# Patient Record
Sex: FEMALE | Race: WHITE | NOT HISPANIC OR LATINO | Employment: OTHER | URBAN - METROPOLITAN AREA
[De-identification: names, ages, dates, MRNs, and addresses within clinical notes are randomized per-mention and may not be internally consistent; named-entity substitution may affect disease eponyms.]

---

## 2021-04-08 DIAGNOSIS — Z23 ENCOUNTER FOR IMMUNIZATION: ICD-10-CM

## 2024-04-08 NOTE — PROGRESS NOTES
4/9/2024      Chief Complaint   Patient presents with    Nephrolithiasis     PT unable to give a Urine sample. PT Urinated 20 min PTV.         Assessment and Plan    78 y.o. female     1. History of nephrolithiasis   2. Flank pain  - Urine sent for culture today. Trace blood. Negative for infection  - US kidney and bladder (1/25/24) in [place] showing 16 mm right ureteral stone, translated by family member  - Continued flank pain  - STAT CT ordered to confirm stone. Once confirmed, recommend fast track surgical intervention for ureteral stone. Discussed risks and benefits of surgical intervention. Discussed stent and stent colic. Discussed possible staged intervention   - Strict ER precautions given  - Call with any questions or concerns in the meantime  - All questions answered; patient understands and agrees with plan       History of Present Illness  Jayda Santos is a 78 y.o. female new patient.     Denies prior  surgical manipulation. Right side flank pain since December. US in January in [place]. US reviewed with patient and family member at appointment. Translated by family member. This is showing a 16 mm right ureteral stone with hydronephrosis. Patient has not passed stone and has not had intervention. Denies fever, chills, nausea, vomiting, gross hematuria.    Review of Systems   Constitutional:  Negative for activity change, appetite change, chills and fever.   HENT:  Negative for congestion and trouble swallowing.    Respiratory:  Negative for cough and shortness of breath.    Cardiovascular:  Negative for chest pain, palpitations and leg swelling.   Gastrointestinal:  Negative for abdominal pain, constipation, diarrhea, nausea and vomiting.   Genitourinary:  Positive for flank pain. Negative for difficulty urinating, dysuria, frequency, hematuria and urgency.   Musculoskeletal:  Negative for back pain and gait problem.   Skin:  Negative for wound.   Allergic/Immunologic: Negative for  "immunocompromised state.   Neurological:  Negative for dizziness and syncope.   Hematological:  Does not bruise/bleed easily.   Psychiatric/Behavioral:  Negative for confusion.    All other systems reviewed and are negative.      Vitals  Vitals:    04/09/24 0802   BP: 142/78   Pulse: 88   Temp: (!) 97.1 °F (36.2 °C)   TempSrc: Temporal   SpO2: 98%   Weight: 73.5 kg (162 lb)   Height: 5' 7\" (1.702 m)       Physical Exam  Constitutional:       General: She is not in acute distress.     Appearance: Normal appearance. She is not ill-appearing, toxic-appearing or diaphoretic.   HENT:      Head: Normocephalic.      Nose: No congestion.   Eyes:      General: No scleral icterus.        Right eye: No discharge.         Left eye: No discharge.      Conjunctiva/sclera: Conjunctivae normal.      Pupils: Pupils are equal, round, and reactive to light.   Pulmonary:      Effort: Pulmonary effort is normal.   Musculoskeletal:      Cervical back: Normal range of motion.   Skin:     General: Skin is warm and dry.      Coloration: Skin is not jaundiced or pale.      Findings: No bruising, erythema, lesion or rash.   Neurological:      General: No focal deficit present.      Mental Status: She is alert and oriented to person, place, and time. Mental status is at baseline.      Gait: Gait normal.   Psychiatric:         Mood and Affect: Mood normal.         Behavior: Behavior normal.         Thought Content: Thought content normal.         Judgment: Judgment normal.           Past History  Past Medical History:   Diagnosis Date    High blood pressure     2019     Social History     Socioeconomic History    Marital status: Unknown     Spouse name: None    Number of children: None    Years of education: None    Highest education level: None   Occupational History    None   Tobacco Use    Smoking status: Every Day     Types: Cigarettes     Passive exposure: Past    Smokeless tobacco: Current    Tobacco comments:     3/4rd to half a pack. " "  Vaping Use    Vaping status: Never Used   Substance and Sexual Activity    Alcohol use: Yes     Comment: Once a month    Drug use: Never    Sexual activity: Not Currently   Other Topics Concern    None   Social History Narrative    None     Social Determinants of Health     Financial Resource Strain: Not on file   Food Insecurity: Not on file   Transportation Needs: Not on file   Physical Activity: Not on file   Stress: Not on file   Social Connections: Not on file   Intimate Partner Violence: Not on file   Housing Stability: Not on file     Social History     Tobacco Use   Smoking Status Every Day    Types: Cigarettes    Passive exposure: Past   Smokeless Tobacco Current   Tobacco Comments    3/4rd to half a pack.     Family History   Problem Relation Age of Onset    Atrial fibrillation Mother     Heart attack Mother     No Known Problems Daughter     Diabetes Brother        The following portions of the patient's history were reviewed and updated as appropriate: allergies, current medications, past medical history, past social history, past surgical history and problem list.    Results  Recent Results (from the past 1 hour(s))   POCT urine dip    Collection Time: 04/09/24  8:35 AM   Result Value Ref Range    LEUKOCYTE ESTERASE,UA -     NITRITE,UA -     SL AMB POCT UROBILINOGEN 0.2     POCT URINE PROTEIN -      PH,UA 5.0     BLOOD,UA trace     SPECIFIC GRAVITY,UA 1.005     KETONES,UA -     BILIRUBIN,UA -     GLUCOSE, UA -      COLOR,UA yelloe     CLARITY,UA clear    ]  No results found for: \"PSA\"  No results found for: \"GLUCOSE\", \"CALCIUM\", \"NA\", \"K\", \"CO2\", \"CL\", \"BUN\", \"CREATININE\"  No results found for: \"WBC\", \"HGB\", \"HCT\", \"MCV\", \"PLT\"    Brooklyn Yarbrough PA-C  "

## 2024-04-09 ENCOUNTER — OFFICE VISIT (OUTPATIENT)
Dept: UROLOGY | Facility: CLINIC | Age: 79
End: 2024-04-09
Payer: COMMERCIAL

## 2024-04-09 VITALS
HEIGHT: 67 IN | SYSTOLIC BLOOD PRESSURE: 142 MMHG | TEMPERATURE: 97.1 F | OXYGEN SATURATION: 98 % | WEIGHT: 162 LBS | HEART RATE: 88 BPM | DIASTOLIC BLOOD PRESSURE: 78 MMHG | BODY MASS INDEX: 25.43 KG/M2

## 2024-04-09 DIAGNOSIS — N20.0 KIDNEY STONE: Primary | ICD-10-CM

## 2024-04-09 LAB
SL AMB  POCT GLUCOSE, UA: NORMAL
SL AMB LEUKOCYTE ESTERASE,UA: NORMAL
SL AMB POCT BILIRUBIN,UA: NORMAL
SL AMB POCT BLOOD,UA: NORMAL
SL AMB POCT CLARITY,UA: CLEAR
SL AMB POCT COLOR,UA: NORMAL
SL AMB POCT KETONES,UA: NORMAL
SL AMB POCT NITRITE,UA: NORMAL
SL AMB POCT PH,UA: 5
SL AMB POCT SPECIFIC GRAVITY,UA: 1
SL AMB POCT URINE PROTEIN: NORMAL
SL AMB POCT UROBILINOGEN: 0.2

## 2024-04-09 PROCEDURE — 99204 OFFICE O/P NEW MOD 45 MIN: CPT | Performed by: PHYSICIAN ASSISTANT

## 2024-04-09 PROCEDURE — 81002 URINALYSIS NONAUTO W/O SCOPE: CPT | Performed by: PHYSICIAN ASSISTANT

## 2024-04-09 PROCEDURE — 87086 URINE CULTURE/COLONY COUNT: CPT | Performed by: PHYSICIAN ASSISTANT

## 2024-04-09 RX ORDER — AMLODIPINE BESYLATE 10 MG/1
10 TABLET ORAL DAILY
COMMUNITY

## 2024-04-09 RX ORDER — ATORVASTATIN CALCIUM 10 MG/1
10 TABLET, FILM COATED ORAL DAILY
COMMUNITY

## 2024-04-10 ENCOUNTER — TELEPHONE (OUTPATIENT)
Dept: UROLOGY | Facility: CLINIC | Age: 79
End: 2024-04-10

## 2024-04-10 ENCOUNTER — PREP FOR PROCEDURE (OUTPATIENT)
Dept: UROLOGY | Facility: CLINIC | Age: 79
End: 2024-04-10

## 2024-04-10 ENCOUNTER — HOSPITAL ENCOUNTER (OUTPATIENT)
Dept: CT IMAGING | Facility: HOSPITAL | Age: 79
Discharge: HOME | End: 2024-04-10
Payer: COMMERCIAL

## 2024-04-10 DIAGNOSIS — N20.0 KIDNEY STONE: ICD-10-CM

## 2024-04-10 DIAGNOSIS — N20.0 NEPHROLITHIASIS: Primary | ICD-10-CM

## 2024-04-10 DIAGNOSIS — R39.89 SUSPECTED UTI: ICD-10-CM

## 2024-04-10 DIAGNOSIS — Z79.01 LONG TERM (CURRENT) USE OF ANTICOAGULANTS: ICD-10-CM

## 2024-04-10 DIAGNOSIS — Z01.812 PRE-OPERATIVE LABORATORY EXAMINATION: ICD-10-CM

## 2024-04-10 DIAGNOSIS — Z01.810 PRE-OPERATIVE CARDIOVASCULAR EXAMINATION: ICD-10-CM

## 2024-04-10 LAB — BACTERIA UR CULT: NORMAL

## 2024-04-10 PROCEDURE — 74176 CT ABD & PELVIS W/O CONTRAST: CPT

## 2024-04-10 NOTE — TELEPHONE ENCOUNTER
Spoke w/ pt's daughter, Luanne/scheduled pt for surgery w/ Dr Michaels @ [facility] for 4/18/24/pre op urine culture, labs, EKG ordered for 4/11/24/High powered laser requested on special needs for OR/surgical packet emailed to pt

## 2024-04-10 NOTE — TELEPHONE ENCOUNTER
Called and spoke to PT's daughter per CC with results and Brooklyn IBRAHIM recommendations. PT's daughter verbalized understanding.    ----- Message from Brooklyn Yarbrough PA-C sent at 4/10/2024  9:37 AM EDT -----  Please let patient know she has stone confirmed on the CT. I recommend fast track ureteroscopy. Case request placed. Strict ER precautions. Thanks

## 2024-04-11 ENCOUNTER — APPOINTMENT (OUTPATIENT)
Dept: LAB | Facility: HOSPITAL | Age: 79
End: 2024-04-11
Payer: COMMERCIAL

## 2024-04-11 ENCOUNTER — OFFICE VISIT (OUTPATIENT)
Dept: LAB | Facility: HOSPITAL | Age: 79
End: 2024-04-11
Payer: COMMERCIAL

## 2024-04-11 DIAGNOSIS — N20.0 NEPHROLITHIASIS: ICD-10-CM

## 2024-04-11 DIAGNOSIS — Z01.812 PRE-OPERATIVE LABORATORY EXAMINATION: ICD-10-CM

## 2024-04-11 DIAGNOSIS — Z01.810 PRE-OPERATIVE CARDIOVASCULAR EXAMINATION: ICD-10-CM

## 2024-04-11 DIAGNOSIS — N20.0 KIDNEY STONE: ICD-10-CM

## 2024-04-11 DIAGNOSIS — Z79.01 LONG TERM (CURRENT) USE OF ANTICOAGULANTS: ICD-10-CM

## 2024-04-11 DIAGNOSIS — R39.89 SUSPECTED UTI: ICD-10-CM

## 2024-04-11 LAB
ANION GAP SERPL CALCULATED.3IONS-SCNC: 5 MMOL/L (ref 4–13)
APTT PPP: 27 SECONDS (ref 23–37)
ATRIAL RATE: 76 BPM
BASOPHILS # BLD AUTO: 0.04 THOUSANDS/ÂΜL (ref 0–0.1)
BASOPHILS NFR BLD AUTO: 1 % (ref 0–1)
BUN SERPL-MCNC: 17 MG/DL (ref 5–25)
CALCIUM SERPL-MCNC: 9.3 MG/DL (ref 8.4–10.2)
CHLORIDE SERPL-SCNC: 108 MMOL/L (ref 96–108)
CO2 SERPL-SCNC: 29 MMOL/L (ref 21–32)
CREAT SERPL-MCNC: 0.91 MG/DL (ref 0.6–1.3)
EOSINOPHIL # BLD AUTO: 0.24 THOUSAND/ÂΜL (ref 0–0.61)
EOSINOPHIL NFR BLD AUTO: 3 % (ref 0–6)
ERYTHROCYTE [DISTWIDTH] IN BLOOD BY AUTOMATED COUNT: 13.1 % (ref 11.6–15.1)
GFR SERPL CREATININE-BSD FRML MDRD: 61 ML/MIN/1.73SQ M
GLUCOSE P FAST SERPL-MCNC: 96 MG/DL (ref 65–99)
HCT VFR BLD AUTO: 38.4 % (ref 34.8–46.1)
HGB BLD-MCNC: 13.2 G/DL (ref 11.5–15.4)
IMM GRANULOCYTES # BLD AUTO: 0.02 THOUSAND/UL (ref 0–0.2)
IMM GRANULOCYTES NFR BLD AUTO: 0 % (ref 0–2)
INR PPP: 1.02 (ref 0.84–1.19)
LYMPHOCYTES # BLD AUTO: 1.62 THOUSANDS/ÂΜL (ref 0.6–4.47)
LYMPHOCYTES NFR BLD AUTO: 21 % (ref 14–44)
MCH RBC QN AUTO: 31 PG (ref 26.8–34.3)
MCHC RBC AUTO-ENTMCNC: 34.4 G/DL (ref 31.4–37.4)
MCV RBC AUTO: 90 FL (ref 82–98)
MONOCYTES # BLD AUTO: 0.62 THOUSAND/ÂΜL (ref 0.17–1.22)
MONOCYTES NFR BLD AUTO: 8 % (ref 4–12)
NEUTROPHILS # BLD AUTO: 5.38 THOUSANDS/ÂΜL (ref 1.85–7.62)
NEUTS SEG NFR BLD AUTO: 67 % (ref 43–75)
NRBC BLD AUTO-RTO: 0 /100 WBCS
P AXIS: 43 DEGREES
PLATELET # BLD AUTO: 202 THOUSANDS/UL (ref 149–390)
PMV BLD AUTO: 9.7 FL (ref 8.9–12.7)
POTASSIUM SERPL-SCNC: 3.7 MMOL/L (ref 3.5–5.3)
PR INTERVAL: 160 MS
PROTHROMBIN TIME: 14.1 SECONDS (ref 11.6–14.5)
QRS AXIS: 27 DEGREES
QRSD INTERVAL: 80 MS
QT INTERVAL: 404 MS
QTC INTERVAL: 454 MS
RBC # BLD AUTO: 4.26 MILLION/UL (ref 3.81–5.12)
SODIUM SERPL-SCNC: 142 MMOL/L (ref 135–147)
T WAVE AXIS: 18 DEGREES
VENTRICULAR RATE: 76 BPM
WBC # BLD AUTO: 7.92 THOUSAND/UL (ref 4.31–10.16)

## 2024-04-11 PROCEDURE — 36415 COLL VENOUS BLD VENIPUNCTURE: CPT

## 2024-04-11 PROCEDURE — 80048 BASIC METABOLIC PNL TOTAL CA: CPT

## 2024-04-11 PROCEDURE — 85730 THROMBOPLASTIN TIME PARTIAL: CPT

## 2024-04-11 PROCEDURE — 85025 COMPLETE CBC W/AUTO DIFF WBC: CPT

## 2024-04-11 PROCEDURE — 87086 URINE CULTURE/COLONY COUNT: CPT

## 2024-04-11 PROCEDURE — 85610 PROTHROMBIN TIME: CPT

## 2024-04-11 PROCEDURE — 93005 ELECTROCARDIOGRAM TRACING: CPT

## 2024-04-12 LAB — BACTERIA UR CULT: NORMAL

## 2024-04-16 NOTE — PRE-PROCEDURE INSTRUCTIONS
Pre-Surgery Instructions:   Medication Instructions    amLODIPine (NORVASC) 10 mg tablet Take Day of Surgery; unless usually taken at night     atorvastatin (LIPITOR) 10 mg tablet Take Day of Surgery; unless usually taken at night     cholecalciferol 1,000 units tablet Avoid 1 week prior to surgery      patient supplied medication Do not take day of surgery; continue as prescribed excluding DOS     Medication instructions for day surgery reviewed. Please use only a sip of water to take your instructed medications. Avoid all over the counter vitamins, supplements and NSAIDS for one week prior to surgery per anesthesia guidelines. Tylenol is ok to take as needed.     You will receive a call one business day prior to surgery with an arrival time and hospital directions. If your surgery is scheduled on a Monday, the hospital will be calling you on the Friday prior to your surgery. If you have not heard from anyone by 8pm, please call the hospital supervisor through the hospital  at [phone]. ([place] [phone] or [place] [phone]).    Do not eat or drink anything after midnight the night before your surgery, including candy, mints, lifesavers, or chewing gum. Do not drink alcohol 24hrs before your surgery. Try not to smoke at least 24hrs before your surgery.       Follow the pre surgery showering instructions as listed in the “My Surgical Experience Booklet” or otherwise provided by your surgeon's office. Do not use a blade to shave the surgical area 1 week before surgery. It is okay to use a clean electric clippers up to 24 hours before surgery. Do not apply any lotions, creams, including makeup, cologne, deodorant, or perfumes after showering on the day of your surgery. Do not use dry shampoo, hair spray, hair gel, or any type of hair products.     No contact lenses, eye make-up, or artificial eyelashes. Remove nail polish, including gel polish, and any artificial, gel, or acrylic nails if  possible. Remove all jewelry including rings and body piercing jewelry.     Wear causal clothing that is easy to take on and off. Consider your type of surgery.    Keep any valuables, jewelry, piercings at home. Please bring any specially ordered equipment (sling, braces) if indicated.    Arrange for a responsible person to drive you to and from the hospital on the day of your surgery. Please confirm the visitor policy for the day of your procedure when you receive your phone call with an arrival time.     Call the surgeon's office with any new illnesses, exposures, or additional questions prior to surgery.    Please reference your “My Surgical Experience Booklet” for additional information to prepare for your upcoming surgery.

## 2024-04-17 ENCOUNTER — ANESTHESIA EVENT (OUTPATIENT)
Dept: PERIOP | Facility: HOSPITAL | Age: 79
End: 2024-04-17
Payer: COMMERCIAL

## 2024-04-17 ENCOUNTER — ANESTHESIA EVENT (OUTPATIENT)
Dept: ANESTHESIOLOGY | Facility: HOSPITAL | Age: 79
End: 2024-04-17

## 2024-04-17 ENCOUNTER — ANESTHESIA (OUTPATIENT)
Dept: ANESTHESIOLOGY | Facility: HOSPITAL | Age: 79
End: 2024-04-17

## 2024-04-18 ENCOUNTER — ANESTHESIA (OUTPATIENT)
Dept: PERIOP | Facility: HOSPITAL | Age: 79
End: 2024-04-18
Payer: COMMERCIAL

## 2024-04-18 ENCOUNTER — TELEPHONE (OUTPATIENT)
Dept: UROLOGY | Facility: CLINIC | Age: 79
End: 2024-04-18

## 2024-04-18 ENCOUNTER — APPOINTMENT (OUTPATIENT)
Dept: RADIOLOGY | Facility: HOSPITAL | Age: 79
End: 2024-04-18
Payer: COMMERCIAL

## 2024-04-18 ENCOUNTER — HOSPITAL ENCOUNTER (OUTPATIENT)
Facility: HOSPITAL | Age: 79
Setting detail: OUTPATIENT SURGERY
Discharge: HOME | End: 2024-04-18
Attending: UROLOGY | Admitting: UROLOGY
Payer: COMMERCIAL

## 2024-04-18 VITALS
RESPIRATION RATE: 12 BRPM | WEIGHT: 160.5 LBS | HEIGHT: 67 IN | BODY MASS INDEX: 25.19 KG/M2 | OXYGEN SATURATION: 99 % | DIASTOLIC BLOOD PRESSURE: 65 MMHG | TEMPERATURE: 97.6 F | HEART RATE: 81 BPM | SYSTOLIC BLOOD PRESSURE: 152 MMHG

## 2024-04-18 DIAGNOSIS — N20.0 NEPHROLITHIASIS: ICD-10-CM

## 2024-04-18 PROBLEM — I10 HTN (HYPERTENSION): Status: ACTIVE | Noted: 2024-04-18

## 2024-04-18 PROBLEM — E78.5 HYPERLIPIDEMIA: Status: ACTIVE | Noted: 2024-04-18

## 2024-04-18 PROCEDURE — C1769 GUIDE WIRE: HCPCS | Performed by: UROLOGY

## 2024-04-18 PROCEDURE — C2617 STENT, NON-COR, TEM W/O DEL: HCPCS | Performed by: UROLOGY

## 2024-04-18 PROCEDURE — 74420 UROGRAPHY RTRGR +-KUB: CPT

## 2024-04-18 PROCEDURE — C1894 INTRO/SHEATH, NON-LASER: HCPCS | Performed by: UROLOGY

## 2024-04-18 PROCEDURE — 82360 CALCULUS ASSAY QUANT: CPT | Performed by: UROLOGY

## 2024-04-18 PROCEDURE — NC001 PR NO CHARGE: Performed by: UROLOGY

## 2024-04-18 PROCEDURE — C1758 CATHETER, URETERAL: HCPCS | Performed by: UROLOGY

## 2024-04-18 PROCEDURE — 52356 CYSTO/URETERO W/LITHOTRIPSY: CPT | Performed by: UROLOGY

## 2024-04-18 DEVICE — INLAY OPTIMA URETERAL STENT W/O GUIDEWIRE
Type: IMPLANTABLE DEVICE | Status: FUNCTIONAL
Brand: BARD® INLAY OPTIMA® URETERAL STENT

## 2024-04-18 RX ORDER — PROPOFOL 10 MG/ML
INJECTION, EMULSION INTRAVENOUS AS NEEDED
Status: DISCONTINUED | OUTPATIENT
Start: 2024-04-18 | End: 2024-04-18

## 2024-04-18 RX ORDER — LIDOCAINE HYDROCHLORIDE 20 MG/ML
INJECTION, SOLUTION EPIDURAL; INFILTRATION; INTRACAUDAL; PERINEURAL AS NEEDED
Status: DISCONTINUED | OUTPATIENT
Start: 2024-04-18 | End: 2024-04-18

## 2024-04-18 RX ORDER — ACETAMINOPHEN 325 MG/1
650 TABLET ORAL EVERY 6 HOURS PRN
Status: DISCONTINUED | OUTPATIENT
Start: 2024-04-18 | End: 2024-04-18 | Stop reason: HOSPADM

## 2024-04-18 RX ORDER — SODIUM CHLORIDE, SODIUM LACTATE, POTASSIUM CHLORIDE, CALCIUM CHLORIDE 600; 310; 30; 20 MG/100ML; MG/100ML; MG/100ML; MG/100ML
100 INJECTION, SOLUTION INTRAVENOUS CONTINUOUS
Status: DISCONTINUED | OUTPATIENT
Start: 2024-04-18 | End: 2024-04-18 | Stop reason: HOSPADM

## 2024-04-18 RX ORDER — MAGNESIUM HYDROXIDE 1200 MG/15ML
LIQUID ORAL AS NEEDED
Status: DISCONTINUED | OUTPATIENT
Start: 2024-04-18 | End: 2024-04-18 | Stop reason: HOSPADM

## 2024-04-18 RX ORDER — ONDANSETRON 2 MG/ML
4 INJECTION INTRAMUSCULAR; INTRAVENOUS ONCE AS NEEDED
Status: DISCONTINUED | OUTPATIENT
Start: 2024-04-18 | End: 2024-04-18 | Stop reason: HOSPADM

## 2024-04-18 RX ORDER — FENTANYL CITRATE/PF 50 MCG/ML
50 SYRINGE (ML) INJECTION
Status: DISCONTINUED | OUTPATIENT
Start: 2024-04-18 | End: 2024-04-18 | Stop reason: HOSPADM

## 2024-04-18 RX ORDER — FENTANYL CITRATE 50 UG/ML
INJECTION, SOLUTION INTRAMUSCULAR; INTRAVENOUS AS NEEDED
Status: DISCONTINUED | OUTPATIENT
Start: 2024-04-18 | End: 2024-04-18

## 2024-04-18 RX ORDER — EPHEDRINE SULFATE 50 MG/ML
INJECTION INTRAVENOUS AS NEEDED
Status: DISCONTINUED | OUTPATIENT
Start: 2024-04-18 | End: 2024-04-18

## 2024-04-18 RX ORDER — CEFAZOLIN SODIUM 1 G/50ML
1000 SOLUTION INTRAVENOUS ONCE
Status: COMPLETED | OUTPATIENT
Start: 2024-04-18 | End: 2024-04-18

## 2024-04-18 RX ORDER — TAMSULOSIN HYDROCHLORIDE 0.4 MG/1
0.4 CAPSULE ORAL
Qty: 20 CAPSULE | Refills: 0 | Status: SHIPPED | OUTPATIENT
Start: 2024-04-18

## 2024-04-18 RX ORDER — SODIUM CHLORIDE, SODIUM LACTATE, POTASSIUM CHLORIDE, CALCIUM CHLORIDE 600; 310; 30; 20 MG/100ML; MG/100ML; MG/100ML; MG/100ML
100 INJECTION, SOLUTION INTRAVENOUS CONTINUOUS
Status: CANCELLED | OUTPATIENT
Start: 2024-04-18

## 2024-04-18 RX ADMIN — FENTANYL CITRATE 50 MCG: 50 INJECTION INTRAMUSCULAR; INTRAVENOUS at 10:53

## 2024-04-18 RX ADMIN — ACETAMINOPHEN 650 MG: 325 TABLET, FILM COATED ORAL at 12:41

## 2024-04-18 RX ADMIN — PROPOFOL 100 MG: 10 INJECTION, EMULSION INTRAVENOUS at 10:05

## 2024-04-18 RX ADMIN — PROPOFOL 100 MG: 10 INJECTION, EMULSION INTRAVENOUS at 10:06

## 2024-04-18 RX ADMIN — LIDOCAINE HYDROCHLORIDE 100 MG: 20 INJECTION, SOLUTION EPIDURAL; INFILTRATION; INTRACAUDAL at 10:04

## 2024-04-18 RX ADMIN — CEFAZOLIN SODIUM 1000 MG: 1 SOLUTION INTRAVENOUS at 10:04

## 2024-04-18 RX ADMIN — SODIUM CHLORIDE, SODIUM LACTATE, POTASSIUM CHLORIDE, AND CALCIUM CHLORIDE: .6; .31; .03; .02 INJECTION, SOLUTION INTRAVENOUS at 09:24

## 2024-04-18 RX ADMIN — FENTANYL CITRATE 50 MCG: 50 INJECTION INTRAMUSCULAR; INTRAVENOUS at 10:12

## 2024-04-18 RX ADMIN — EPHEDRINE SULFATE 5 MG: 50 INJECTION, SOLUTION INTRAVENOUS at 10:28

## 2024-04-18 NOTE — ANESTHESIA POSTPROCEDURE EVALUATION
Post-Op Assessment Note    CV Status:  Stable  Pain Score: 1    Pain management: adequate       Mental Status:  Alert and awake   Hydration Status:  Euvolemic   PONV Controlled:  Controlled   Airway Patency:  Patent     Post Op Vitals Reviewed: Yes    No anethesia notable event occurred.    Staff: CRNA               BP   149/70   Temp 97.5   Pulse 81   Resp 13   SpO2 100

## 2024-04-18 NOTE — OP NOTE
OPERATIVE REPORT  PATIENT NAME: Jayda Santos    :  1945  MRN: [id number]  Pt Location: [facility] OR ROOM 04    SURGERY DATE: 2024    Surgeons and Role:     * Michele Michaels DO - Primary    Preop Diagnosis:  Nephrolithiasis [N20.0]    Post-Op Diagnosis Codes:     * Nephrolithiasis [N20.0]    Procedure(s):  Right - CYSTOSCOPY URETEROSCOPY WITH LITHOTRIPSY HOLMIUM LASER. RETROGRADE PYELOGRAM AND INSERTION STENT URETERAL. Basket Stone extraction    Specimen(s):  ID Type Source Tests Collected by Time Destination   A : Right ureteral stone Calculus Ureter, Right STONE ANALYSIS Michele Michaels DO 2024 1037        Estimated Blood Loss:   Minimal    Drains:  Ureteral Internal Stent Right ureter 6 Fr. (Active)   Number of days: 0       Anesthesia Type:   General    Operative Indications:  Nephrolithiasis [N20.0]        78-year-old female with urolithiasis     No anticoagulation     She was seen in the urology office in 2024 with right flank pain since 2023     CT abdomen pelvis without contrast 4/10/2024: Severe right hydronephrosis secondary to 1 cm right proximal ureteral stone; nonobstructing left kidney stones; no stones in the right kidney     Creatinine 0.91 on 2024      Presented today for right sided stone treatment            Operative Findings:        No meatal stenosis  No urethral strictures  Bilateral ureteral orifices in orthotopic positions  No bladder lesions  No stones in bladder  No trabeculations  No diverticuli  Right retrograde pyelogram: Moderate to severe hydronephrosis, filling defect at right proximal ureter signifying stone  Right pyeloscopy: 1 cm proximal ureteral stone was encountered in the proximal ureter.  Stone was impacted.  Stone was fragmented using 272 µm laser fiber.  Fragments removed using Skylight basket.  No other stone seen in the kidney or ureter.  Back out ureteroscopy: No stone fragments, positive urothelial inflammation at the  site of impaction at the proximal ureter; no additional urothelial injury  Successfully placed 6 x 26 double-J ureteral stent with no string attached              Complications:   None    Procedure and Technique:               Patient identified in the preop holding area.  Consent was obtained.  Risks and benefits of the procedure were explained to the patient.  Patient was in agreement.  Patient was brought back to the OR and placed upon the table.  Bilateral lower extremity SCDs were placed and turned on.  Patient received IV Ancef for antibiotics.  Patient underwent smooth induction of anesthesia.  Bilateral lower extremities were placed in stirrups.  Patient was in dorsal lithotomy position.  Area was prepped and draped in sterile fashion.  Timeout was performed by the OR team.     Case began with insertion of 21 Greenlandic rigid cystoscope.  Pan cystourethroscopy was performed.  See the above findings for details.     Attention was turned toward the right ureteral orifice.      5 Greenlandic open-ended catheter was advanced through the bridge of the scope toward the ureteral orifice.  Guidewire was advanced through the 5 Greenlandic open-ended catheter, into the ureter, and coiled in renal pelvis under fluoroscopic guidance.        Dual-lumen catheter was advanced over the wire and into the ureter under fluoroscopic guidance.  Retrograde pyelogram was performed at this time.  See the above findings for details.  Second wire was placed through the dual-lumen catheter and coiled into the renal pelvis under fluoroscopic guidance.  Dual-lumen catheter was removed over the 2 wires in a push pull fashion.    It was decided that an access sheath would be utilized. 11-13 Fr 35 cm access sheath was advanced over one of the wires under fluoroscopic guidance toward the proximal ureter.  Wire and inner sheath were removed. Flexible reusable ureteroscope was assembled.  Scope was advanced into the renal pelvis.  Pyeloscopy was performed.   See the above findings for details regarding stone works.        Backout flexible ureteroscopy was then performed.  See above findings for details.    The ureteroscope was then removed from the bladder and urethra.     Cystoscope was reentered into the bladder over the wire toward the right ureteral orifice.  6 x 26 double-J ureteral stent was advanced over the wire toward the renal pelvis under fluoroscopic guidance.  Wire was removed and stent was deployed.  Good proximal curl was seen on fluoroscopy.  Good distal curl was seen on direct cystoscopy.      There was no string left on the stent.      Bladder was emptied and scope was removed.     Patient was uneventfully awoken from anesthesia and extubated.  Patient was brought to PACU in good condition.                 A qualified resident physician was not available.    Patient Disposition:  PACU         SIGNATURE: Michele Michaels DO  DATE: April 18, 2024  TIME: 11:36 AM          PLAN  -Right-sided 6 x 26 double-J ureteral stent in place.  No string.  Can be removed with office cystoscopy, stent removal in 2 weeks.

## 2024-04-18 NOTE — TELEPHONE ENCOUNTER
Patient is status post right ureteroscopy on 4/18/2024 at [place]    Has right-sided 6 x 26 double-J ureteral stent in place.  No string.    Stent can be removed in the office with cystoscopy, stent pull in about 2 weeks

## 2024-04-18 NOTE — H&P
UROLOGY H&P NOTE     Patient Identifiers: Jayda Santos (MRN [id number])    Date of Service: 4/18/2024    History of Present Illness:     Jayda Santos is a 78 y.o. old with a history of right ureteral stone    Past Medical, Past Surgical History:     Past Medical History:   Diagnosis Date    High blood pressure     2019    Hyperlipidemia     Hypertension    :    Past Surgical History:   Procedure Laterality Date    COLONOSCOPY      GALLBLADDER SURGERY      1996   :    Medications, Allergies:     Current Facility-Administered Medications   Medication Dose Route Frequency    ceFAZolin (ANCEF) IVPB (premix in dextrose) 1,000 mg 50 mL  1,000 mg Intravenous Once    lactated ringers infusion  100 mL/hr Intravenous Continuous       Allergies:  No Known Allergies:    Social and Family History:   Social History:   Social History     Tobacco Use    Smoking status: Every Day     Current packs/day: 0.25     Types: Cigarettes     Passive exposure: Past    Smokeless tobacco: Current    Tobacco comments:     3/4rd to half a pack.   Vaping Use    Vaping status: Never Used   Substance Use Topics    Alcohol use: Yes     Comment: Once a month    Drug use: Never   .    Social History     Tobacco Use   Smoking Status Every Day    Current packs/day: 0.25    Types: Cigarettes    Passive exposure: Past   Smokeless Tobacco Current   Tobacco Comments    3/4rd to half a pack.       Family History:  Family History   Problem Relation Age of Onset    Atrial fibrillation Mother     Heart attack Mother     No Known Problems Daughter     Diabetes Brother    :     Review of Systems:     General: Fever, chills, or night sweats: negative  Cardiac: Negative for chest pain.    Pulmonary: Negative for shortness of breath.  Gastrointestinal: Abdominal pain negative.  Nausea, vomiting, or diarrhea negative,  Genitourinary: See HPI above.  Patient does not have hematuria.  All other systems queried were negative.    Physical Exam:   General: Patient  "is pleasant and in NAD. Awake and alert  /71   Pulse 84   Temp 97.9 °F (36.6 °C) (Temporal)   Resp 18   Ht 5' 7\" (1.702 m)   Wt 72.8 kg (160 lb 7.9 oz)   SpO2 95%   BMI 25.14 kg/m² Temp (24hrs), Av.9 °F (36.6 °C), Min:97.9 °F (36.6 °C), Max:97.9 °F (36.6 °C)  current; Temperature: 97.9 °F (36.6 °C)  No intake/output data recorded.  Cardiac: Peripheral edema: negative  Pulmonary: Non-labored breathing  Abdomen: Soft, non-tender, non-distended.  No surgical scars.  No masses, tenderness, hernias noted.    Genitourinary: Negative CVA tenderness, negative suprapubic tenderness.        Labs:     Lab Results   Component Value Date    HGB 13.2 2024    HCT 38.4 2024    WBC 7.92 2024     2024   ]    Lab Results   Component Value Date    K 3.7 2024     2024    CO2 29 2024    BUN 17 2024    CREATININE 0.91 2024    CALCIUM 9.3 2024   ]    Imaging:   I personally reviewed the images and report of the following studies, and reviewed them with the patient:    Reviewed, see below      ASSESSMENT:     78-year-old female with urolithiasis    No anticoagulation    She was seen in the urology office in 2024 with right flank pain since 2023    CT abdomen pelvis without contrast 4/10/2024: Severe right hydronephrosis secondary to 1 cm right proximal ureteral stone; nonobstructing left kidney stones; no stones in the right kidney    Creatinine 0.91 on 2024      We discussed ureteroscopy    I explained that ureteroscopy consisted of patient going to sleep and having scope initially placed into the urethra and bladder.  From there, x-rays would be shot in the ureter and kidney in order to assess for safety wire placement and stone location.  At that point, if possible, a smaller scope would be placed into the ureter and/or kidney to look for stones.  Once the stones were located, they would either be removed with a basket, or " "lasered into smaller pieces.  Once they were broken up into smaller pieces, they would either be \"dusted\" into very small fragments that would pass on their own or \"fragmented\" into slightly larger pieces that would be able to be removed in their entirety with the basket.    I explained that after the procedure, most patients needed placement of a ureteral stent.  I explained that ureteral stent is essentially a straw with 2 curly ends. One curl is in the kidney and the other curl is in the bladder.  The stent would allow urine to safely pass from the kidney to the bladder without obstruction.  I explained that in the vast majority of circumstances, the placement of the ureteral stent was not permanent.  I explained that sometimes we are able to leave a string coming out of the end of the stent which comes out of the urethra.  I explained that these types of patients will get instructions in their discharge packet which tells them when to pull the string and remove the stent in its entirety.  I also explained that there are circumstances where we are not able to do this and there is no string coming off the stent.  In these cases, the patient's would need to come to the office for a quick 2-minute procedure called cystoscopy, removal of ureteral stent.    I also explained that there are unfortunately times where we are not able to get up with a smaller scope into the ureter.  In the circumstances, we place ureteral stent and have patient come back to the OR a few weeks later so that the ureter is more dilated and can better tolerate a scope.    I explained that based on data from the urine culture and the appearance of the urine during the case, the patient may or may not be discharged with antibiotics after the procedure.    I did explain that having ureteral stent in place can be quite uncomfortable for some patients.  I explained that I do not routinely give patient's narcotics for this type of procedure.  The " patient verbalized understanding.  I did state that I often prescribe medications to help with stent discomfort including but not limited to: Daily tamsulosin 0.4 mg, daily as needed oxybutynin 5 mg XR, as needed diclofenac 50 mg twice daily.  I also told the patient that I often have patients use over-the-counter Tylenol around-the-clock for the first few days.  I also explained the most important thing for pain often is staying very well-hydrated and that patients should plan to drink plenty of water after the procedure.    I explained that it can be normal to have blood in urine after this procedure.  I explained that as long as the urine was lighter than fruit punch and that there were no blood clots being passed, and that the patient was able to urinate, nothing urgently needed to be done about blood in the urine.  I did explain that it was very important to stay hydrated after the procedure.    We also went over the risk of infection from the procedure.  I explained that if the urine did appear to have a very infected appearance, there are times where we have to simply place a ureteral stent and defer definitive stone management a few weeks later after the patient gets additional antibiotics.  I also explained that there is a low, but nonzero risk of developing a serious infection after stone treatment which would potentially require hospitalization and IV antibiotics.    I also explained that there was a risk of injury to urethra, bladder, ureter, kidneys during the procedure.  I explained that if there appeared to be any serious injury to the urethra bladder, we would likely just leave Fink catheter for a number of days.  I explained that if there appeared to be extensive injury to the ureter or kidney, we often would elect to leave a ureteral stent in place for a longer period of time.  I explained that in extremely rare circumstances, there would be a severe injury to the ureter or kidney which would  actually require placement of percutaneous nephrostomy tube in order for proper drainage of the urine.    I also explained that there are of course risks to getting general anesthesia such as cardiac or pulmonary complications.  I also explained that there could be risk of developing blood clots.    Additionally, we did review alternative treatments which include extracorporal shockwave lithotripsy, percutaneous nephrolithotomy, observation.  We discussed the risks and benefits of each of these strategies.              PLAN:       -ancef preop  -OR for R stone treatment  -dc home from pacu

## 2024-04-18 NOTE — DISCHARGE INSTR - AVS FIRST PAGE
Ms. Babakallysonjavier,          We were able to laser and remove the stone in your right ureter.  You had no additional stones in your right kidney.    You have a ureteral stent in place.  See the postoperative instructions below for details.    Please call the office with any questions or concerns.          Sincerely,  Michele Ousmanedawn            You had procedure on your ureter and kidney.      You had ureteral stent placed.  This is a tube that is placed in your ureter to keep urine draining from your kidney to your bladder.  It may cause discomfort in the form of back spasms or lower abdominal spasms.  This is normal and can be treated with medications if need be.      You may see some blood in your urine.  This is normal.  Please drink plenty of fluids.      Please call the office if you notice that you are passing large blood clots or if you are unable to urinate.      You have been given a prescription for Flomax.  Please take this daily while your ureteral stent is in place. This medication can occasionally cause lightheadedness. Please stop medication if you experience this or any other concerning side effects.      You may take Tylenol as needed for pain.        Pain control plan: Having a ureteral stent is often very uncomfortable. In order to stay on top of your pain, please take over the counter Tylenol around the clock. Additionally take Flomax daily with your stent in place whether or not you are having discomfort.     Most importantly, please drink lots of fluids, as this is the best way to avoid pain with your stent!        You may shower and bathe as usual when you get home.    You do not have any incisions and can resume typical physical activities, as long as you are not having too much discomfort with stent in place.    Please call the office or present to the ED if you experience concerning signs or symptoms including but not limited to: fevers, chills, nausea, vomiting, worsening flank pain, worsening  abdominal pain, passage of large blood clots in the urine, inability to urinate.    You will follow up in the office in about 2 weeks to have your ureteral stent removed.  Office will call you with details.

## 2024-04-18 NOTE — ANESTHESIA PREPROCEDURE EVALUATION
Procedure:  CYSTOSCOPY URETEROSCOPY WITH LITHOTRIPSY HOLMIUM LASER, RETROGRADE PYELOGRAM AND INSERTION STENT URETERAL (Right: Bladder)    Relevant Problems   ANESTHESIA (within normal limits)      CARDIO   (+) HTN (hypertension)   (+) Hyperlipidemia      ENDO (within normal limits)      PULMONARY (within normal limits)        Physical Exam    Airway    Mallampati score: I  TM Distance: >3 FB  Neck ROM: full     Dental    upper dentures,     Cardiovascular  Rhythm: regular, Rate: normal    Pulmonary   Breath sounds clear to auscultation    Other Findings  post-pubertal.      Anesthesia Plan  ASA Score- 2     Anesthesia Type- general with ASA Monitors.         Additional Monitors:     Airway Plan: LMA.           Plan Factors-Exercise tolerance (METS): >4 METS.    Chart reviewed. EKG reviewed.  Existing labs reviewed. Patient summary reviewed.    Patient is not a current smoker.              Induction- intravenous.    Postoperative Plan- Plan for postoperative opioid use.     Informed Consent- Anesthetic plan and risks discussed with patient and daughter.  I personally reviewed this patient with the CRNA. Discussed and agreed on the Anesthesia Plan with the CRNA..

## 2024-04-19 NOTE — TELEPHONE ENCOUNTER
Pt's daughter calling with update pt is doing fine no fever or any other symptoms she is managing pain/discomfort with Tylenol they picked up Flomax 04/18/24 and started pt is urinating fine with little pink tinge no blood clots,Luanne stated she is RN also if any further questions please contact her directly as she is listed on Communication Consent.

## 2024-04-19 NOTE — TELEPHONE ENCOUNTER
Spoke with patients daughter Luanne per cc. Daughter has some frequency and blood in urine, no blood clots noted. Advised of normal post op expectations. Advised to increase water intake. Confirmed stent removal appt. Pain manageable with tylenol. Advised to contact office with any questions or concerns in the meantime.

## 2024-04-26 LAB
CALCIUM OXALATE DIHYDRATE MFR STONE IR: 30 %
COLOR STONE: NORMAL
COM MFR STONE: 50 %
COMMENT-STONE3: NORMAL
COMPOSITION: NORMAL
HYDROXYAPATITE 24H ENGDIFF UR: 20 %
LABORATORY COMMENT REPORT: NORMAL
PHOTO: NORMAL
SIZE STONE: NORMAL MM
SPEC SOURCE SUBJ: NORMAL
STONE ANALYSIS-IMP: NORMAL
WT STONE: 147 MG

## 2024-05-01 DIAGNOSIS — N20.0 NEPHROLITHIASIS: ICD-10-CM

## 2024-05-01 NOTE — PROGRESS NOTES
Cystoscopy     Date/Time  5/2/2024 11:00 AM     Performed by  Brooklyn Yarbrough PA-C   Authorized by  Brooklyn Yarbrough PA-C     Universal Protocol:  Consent: Verbal consent obtained. Written consent obtained.  Consent given by: patient  Patient understanding: patient states understanding of the procedure being performed  Patient consent: the patient's understanding of the procedure matches consent given  Procedure consent: procedure consent matches procedure scheduled  Patient identity confirmed: verbally with patient      Procedure Details:  Procedure type: simple removal of a foreign body, stone, or stent    Patient tolerance: Patient tolerated the procedure well with no immediate complications    Additional Procedure Details: Patient cleaned and draped in sterile fashion. Urojet injected into urethra. Cystoscope introduced in urethra and advanced into bladder with visualization and then subsequent removal of ureteral stent using stent grasper device.            Assessment and Plan:  Nephrolithiasis  - UA today unable to be obtained  - Follow up in 6 weeks with US prior to visit  - ER precautions      Brooklyn Yarbrough PA-C

## 2024-05-02 ENCOUNTER — PROCEDURE VISIT (OUTPATIENT)
Dept: UROLOGY | Facility: CLINIC | Age: 79
End: 2024-05-02
Payer: COMMERCIAL

## 2024-05-02 VITALS
BODY MASS INDEX: 24.48 KG/M2 | RESPIRATION RATE: 16 BRPM | WEIGHT: 156 LBS | SYSTOLIC BLOOD PRESSURE: 145 MMHG | TEMPERATURE: 98.1 F | DIASTOLIC BLOOD PRESSURE: 86 MMHG | HEART RATE: 105 BPM | HEIGHT: 67 IN | OXYGEN SATURATION: 95 %

## 2024-05-02 DIAGNOSIS — N20.0 NEPHROLITHIASIS: Primary | ICD-10-CM

## 2024-05-02 PROCEDURE — 52310 CYSTOSCOPY AND TREATMENT: CPT | Performed by: PHYSICIAN ASSISTANT

## 2024-05-02 RX ORDER — TAMSULOSIN HYDROCHLORIDE 0.4 MG/1
CAPSULE ORAL
Qty: 20 CAPSULE | Refills: 5 | Status: SHIPPED | OUTPATIENT
Start: 2024-05-02

## 2024-06-05 ENCOUNTER — HOSPITAL ENCOUNTER (OUTPATIENT)
Dept: ULTRASOUND IMAGING | Facility: HOSPITAL | Age: 79
Discharge: HOME | End: 2024-06-05
Payer: COMMERCIAL

## 2024-06-05 ENCOUNTER — HOSPITAL ENCOUNTER (OUTPATIENT)
Dept: RADIOLOGY | Facility: HOSPITAL | Age: 79
Discharge: HOME | End: 2024-06-05
Payer: COMMERCIAL

## 2024-06-05 DIAGNOSIS — N20.0 NEPHROLITHIASIS: ICD-10-CM

## 2024-06-05 PROCEDURE — 74018 RADEX ABDOMEN 1 VIEW: CPT

## 2024-06-05 PROCEDURE — 76775 US EXAM ABDO BACK WALL LIM: CPT

## 2024-06-12 NOTE — PROGRESS NOTES
6/13/2024      Chief Complaint   Patient presents with    Follow-up         Assessment and Plan    78 y.o. female     1. Nephrolithiasis   - s/p ureteroscopy and stent removal via cystoscopy  - Follow up US and KUB showing mild hydronephrosis on right, improved from prior; left lower pole stone, several left cysts  - Mild intermittent right side pain  - Discussed repeating CT scan for further evaluation. Patient is returning to [place] for several months. Would like to continue care in [place]. Discussed conservative measures with adequate hydration and dietary changes. Hand out given. Discussed ER precautions  - Discussed follow up, however, patient is unaware when she will return from [place]. Will call if she would like follow up  - Call with any questions or concerns in the meantime  - All questions answered; patient understands and agrees with plan       History of Present Illness  Jayda Santos is a 78 y.o. female patient with history of nephrolithiasis here for follow up.     Patient underwent ureteroscopy for 16 mm right ureteral stone in April 2024. Stent removed via cystoscopy in May 2024. Doing well, asymptomatic. Repeat imaging as above. Does occasionally have right side flank discomfort. Denies gross hematuria, stone passage fever, chills, nausea, vomiting. Patient is returning to [place] for many months. Unsure if or when of return.     Review of Systems   Constitutional:  Negative for activity change, appetite change, chills and fever.   HENT:  Negative for congestion and trouble swallowing.    Respiratory:  Negative for cough and shortness of breath.    Cardiovascular:  Negative for chest pain, palpitations and leg swelling.   Gastrointestinal:  Negative for abdominal pain, constipation, diarrhea, nausea and vomiting.   Genitourinary:  Positive for flank pain (occasional right). Negative for difficulty urinating, dysuria, frequency, hematuria and urgency.   Musculoskeletal:  Negative for back pain and  "gait problem.   Skin:  Negative for wound.   Allergic/Immunologic: Negative for immunocompromised state.   Neurological:  Negative for dizziness and syncope.   Hematological:  Does not bruise/bleed easily.   Psychiatric/Behavioral:  Negative for confusion.    All other systems reviewed and are negative.      Vitals  Vitals:    06/13/24 0813   BP: 132/79   Pulse: 79   Resp: 15   Temp: 98.1 °F (36.7 °C)   SpO2: 99%   Weight: 73 kg (161 lb)   Height: 5' 7\" (1.702 m)       Physical Exam  Constitutional:       General: She is not in acute distress.     Appearance: Normal appearance. She is not ill-appearing, toxic-appearing or diaphoretic.   HENT:      Head: Normocephalic.      Nose: No congestion.   Eyes:      General: No scleral icterus.        Right eye: No discharge.         Left eye: No discharge.      Conjunctiva/sclera: Conjunctivae normal.      Pupils: Pupils are equal, round, and reactive to light.   Pulmonary:      Effort: Pulmonary effort is normal.   Musculoskeletal:      Cervical back: Normal range of motion.   Skin:     General: Skin is warm and dry.      Coloration: Skin is not jaundiced or pale.      Findings: No bruising, erythema, lesion or rash.   Neurological:      General: No focal deficit present.      Mental Status: She is alert and oriented to person, place, and time. Mental status is at baseline.      Gait: Gait normal.   Psychiatric:         Mood and Affect: Mood normal.         Behavior: Behavior normal.         Thought Content: Thought content normal.         Judgment: Judgment normal.           Past History  Past Medical History:   Diagnosis Date    High blood pressure     2019    Hyperlipidemia     Hypertension     Kidney stone      Social History     Socioeconomic History    Marital status: Unknown     Spouse name: None    Number of children: None    Years of education: None    Highest education level: None   Occupational History    None   Tobacco Use    Smoking status: Every Day     " "Current packs/day: 0.25     Types: Cigarettes     Passive exposure: Past    Smokeless tobacco: Current    Tobacco comments:     3/4rd to half a pack.   Vaping Use    Vaping status: Never Used   Substance and Sexual Activity    Alcohol use: Yes     Comment: Once a month    Drug use: Never    Sexual activity: Not Currently   Other Topics Concern    None   Social History Narrative    None     Social Determinants of Health     Financial Resource Strain: Not on file   Food Insecurity: Not on file   Transportation Needs: Not on file   Physical Activity: Not on file   Stress: Not on file   Social Connections: Not on file   Intimate Partner Violence: Not on file   Housing Stability: Not on file     Social History     Tobacco Use   Smoking Status Every Day    Current packs/day: 0.25    Types: Cigarettes    Passive exposure: Past   Smokeless Tobacco Current   Tobacco Comments    3/4rd to half a pack.     Family History   Problem Relation Age of Onset    Atrial fibrillation Mother     Heart attack Mother     No Known Problems Daughter     Diabetes Brother        The following portions of the patient's history were reviewed and updated as appropriate: allergies, current medications, past medical history, past social history, past surgical history and problem list.    Results  No results found for this or any previous visit (from the past 1 hour(s)).]  No results found for: \"PSA\"  Lab Results   Component Value Date    CALCIUM 9.3 04/11/2024    K 3.7 04/11/2024    CO2 29 04/11/2024     04/11/2024    BUN 17 04/11/2024    CREATININE 0.91 04/11/2024     Lab Results   Component Value Date    WBC 7.92 04/11/2024    HGB 13.2 04/11/2024    HCT 38.4 04/11/2024    MCV 90 04/11/2024     04/11/2024       Brooklyn Yarbrough PA-C  "

## 2024-06-13 ENCOUNTER — TELEPHONE (OUTPATIENT)
Age: 79
End: 2024-06-13

## 2024-06-13 ENCOUNTER — OFFICE VISIT (OUTPATIENT)
Dept: UROLOGY | Facility: CLINIC | Age: 79
End: 2024-06-13
Payer: COMMERCIAL

## 2024-06-13 VITALS
HEIGHT: 67 IN | TEMPERATURE: 98.1 F | RESPIRATION RATE: 15 BRPM | SYSTOLIC BLOOD PRESSURE: 132 MMHG | DIASTOLIC BLOOD PRESSURE: 79 MMHG | HEART RATE: 79 BPM | WEIGHT: 161 LBS | BODY MASS INDEX: 25.27 KG/M2 | OXYGEN SATURATION: 99 %

## 2024-06-13 DIAGNOSIS — N20.0 NEPHROLITHIASIS: Primary | ICD-10-CM

## 2024-06-13 PROCEDURE — 99213 OFFICE O/P EST LOW 20 MIN: CPT | Performed by: PHYSICIAN ASSISTANT

## 2024-06-13 NOTE — TELEPHONE ENCOUNTER
Radiology Test Results - Chana from Radiology Calling with report update    Pt under care of: Brooklyn Yarbrough     Imaging Completed: US    Significant Findings - Please review

## (undated) DEVICE — CATH URET .038 10FR 50CM DUAL LUMEN

## (undated) DEVICE — SPONGE 4 X 4 XRAY 16 PLY STRL LF RFD

## (undated) DEVICE — PACK TUR

## (undated) DEVICE — LUBRICANT JELLY SURGILUBE TUBE 4OZ FLIP TOP

## (undated) DEVICE — INVIEW CLEAR LEGGINGS: Brand: CONVERTORS

## (undated) DEVICE — CHLORHEXIDINE 4PCT 4 OZ

## (undated) DEVICE — BASKET SPECIMEN RETRIVAL 1.9FR 120CM

## (undated) DEVICE — UROLOGIC DRAIN BAG: Brand: UNBRANDED

## (undated) DEVICE — GLOVE SRG BIOGEL 7

## (undated) DEVICE — GLOVE INDICATOR PI UNDERGLOVE SZ 8 BLUE

## (undated) DEVICE — GUIDEWIRE STRGHT TIP 0.035 IN  SOLO PLUS

## (undated) DEVICE — SHEATH URETERAL ACCESS 11/13FR 35CM PROXIS

## (undated) DEVICE — CATH URETERAL 5FR X 70 CM FLEX TIP POLYUR BARD

## (undated) DEVICE — 4-PORT MANIFOLD: Brand: NEPTUNE 2